# Patient Record
Sex: MALE | Race: WHITE | NOT HISPANIC OR LATINO | URBAN - METROPOLITAN AREA
[De-identification: names, ages, dates, MRNs, and addresses within clinical notes are randomized per-mention and may not be internally consistent; named-entity substitution may affect disease eponyms.]

---

## 2021-01-04 ENCOUNTER — OUT OF OFFICE VISIT (OUTPATIENT)
Dept: URBAN - METROPOLITAN AREA MEDICAL CENTER 24 | Facility: MEDICAL CENTER | Age: 61
End: 2021-01-04
Payer: COMMERCIAL

## 2021-01-04 DIAGNOSIS — E80.6 ABNORMAL BILIRUBIN TEST: ICD-10-CM

## 2021-01-04 DIAGNOSIS — R93.3 ABN FINDINGS-GI TRACT: ICD-10-CM

## 2021-01-04 DIAGNOSIS — R74.8 ABNORMAL ALKALINE PHOSPHATASE TEST: ICD-10-CM

## 2021-01-04 DIAGNOSIS — K76.0 FATTY (CHANGE OF) LIVER, NOT ELSEWHERE CLASSIFIED: ICD-10-CM

## 2021-01-04 PROCEDURE — 99222 1ST HOSP IP/OBS MODERATE 55: CPT | Performed by: INTERNAL MEDICINE

## 2021-01-04 PROCEDURE — G8427 DOCREV CUR MEDS BY ELIG CLIN: HCPCS | Performed by: INTERNAL MEDICINE

## 2021-01-04 PROCEDURE — 99232 SBSQ HOSP IP/OBS MODERATE 35: CPT | Performed by: INTERNAL MEDICINE

## 2021-01-05 ENCOUNTER — OUT OF OFFICE VISIT (OUTPATIENT)
Dept: URBAN - METROPOLITAN AREA MEDICAL CENTER 24 | Facility: MEDICAL CENTER | Age: 61
End: 2021-01-05
Payer: COMMERCIAL

## 2021-01-05 DIAGNOSIS — K86.89 ATROPHIC PANCREAS: ICD-10-CM

## 2021-01-05 DIAGNOSIS — K83.1 AMPULLARY STENOSIS: ICD-10-CM

## 2021-01-05 PROCEDURE — 43262 ENDO CHOLANGIOPANCREATOGRAPH: CPT | Performed by: INTERNAL MEDICINE

## 2021-01-05 PROCEDURE — 43260 ERCP W/SPECIMEN COLLECTION: CPT | Performed by: INTERNAL MEDICINE

## 2021-01-12 ENCOUNTER — OFFICE VISIT (OUTPATIENT)
Dept: URBAN - METROPOLITAN AREA CLINIC 86 | Facility: CLINIC | Age: 61
End: 2021-01-12
Payer: COMMERCIAL

## 2021-01-12 ENCOUNTER — LAB OUTSIDE AN ENCOUNTER (OUTPATIENT)
Dept: URBAN - METROPOLITAN AREA CLINIC 86 | Facility: CLINIC | Age: 61
End: 2021-01-12

## 2021-01-12 DIAGNOSIS — R97.8 ELEVATED CA 19-9 LEVEL: ICD-10-CM

## 2021-01-12 DIAGNOSIS — R17 JAUNDICE: ICD-10-CM

## 2021-01-12 DIAGNOSIS — E66.8 OTHER OBESITY: ICD-10-CM

## 2021-01-12 DIAGNOSIS — E66.9 OBESITY: ICD-10-CM

## 2021-01-12 DIAGNOSIS — K76.0 FATTY LIVER: ICD-10-CM

## 2021-01-12 DIAGNOSIS — R74.8 ABNORMAL LIVER ENZYMES: ICD-10-CM

## 2021-01-12 DIAGNOSIS — Z71.89 VACCINE COUNSELING: ICD-10-CM

## 2021-01-12 PROCEDURE — 99244 OFF/OP CNSLTJ NEW/EST MOD 40: CPT

## 2021-01-12 PROCEDURE — 87522 HEPATITIS C REVRS TRNSCRPJ: CPT

## 2021-01-12 PROCEDURE — 82104 ALPHA-1-ANTITRYPSIN PHENO: CPT

## 2021-01-12 PROCEDURE — 1036F TOBACCO NON-USER: CPT

## 2021-01-12 PROCEDURE — 99204 OFFICE O/P NEW MOD 45 MIN: CPT

## 2021-01-12 PROCEDURE — G9903 PT SCRN TBCO ID AS NON USER: HCPCS

## 2021-01-12 RX ORDER — DOCUSATE SODIUM 100 MG/1
1 CAPSULE AS NEEDED CAPSULE, LIQUID FILLED ORAL ONCE A DAY
Status: ON HOLD | COMMUNITY

## 2021-01-12 RX ORDER — IBUPROFEN 600 MG/1
1 TABLET WITH FOOD OR MILK AS NEEDED TABLET ORAL THREE TIMES A DAY
Status: ON HOLD | COMMUNITY

## 2021-01-12 RX ORDER — MUPIROCIN 20 MG/G
1 APPLICATION OINTMENT TOPICAL THREE TIMES A DAY
Status: ON HOLD | COMMUNITY

## 2021-01-12 RX ORDER — ESOMEPRAZOLE MAGNESIUM 20 MG/1
1 CAPSULE CAPSULE, DELAYED RELEASE ORAL ONCE A DAY
Status: ACTIVE | COMMUNITY

## 2021-01-12 RX ORDER — HYDROCODONE BITARTRATE AND ACETAMINOPHEN 5; 325 MG/1; MG/1
1 TABLET AS NEEDED TABLET ORAL
Status: ON HOLD | COMMUNITY

## 2021-01-12 RX ORDER — ONDANSETRON 4 MG/1
1 TABLET ON THE TONGUE AND ALLOW TO DISSOLVE TABLET, ORALLY DISINTEGRATING ORAL ONCE A DAY
Status: ON HOLD | COMMUNITY

## 2021-01-12 NOTE — HPI-TODAY'S VISIT:
Pt here for fatty liver and abnormal lfts.   pt had abd pain early summer 2020, ct scan done at that time and and neg at that time. then in oct 2020 had severe abd pain and ct scan done at that time, ct not done.   he has lost 10lbs since dec due to not being able to eat.  he is working on this, reduced soda intake and candy intake.   He went in again for abd pain and his lfts have improved from 1/2 to after eus done.   ercp attempted but was not able to do-is scheduled to do it again at [place].   Labs from January 2nd 2021 show sodium 139, potassium 3.9 , , alt 691, total bilirubin 5.0,  , creatinine 0.9. he was at [facility] and had a eus done by Dr. Hamilton on 1/5: a small tiny structure was noted in the bile duct in the head of the pancreas, fine needle aspiration of the structure was done . Normal pancreatic duct noted. Recommend ca 19-9 level and ERCP an follow up of FNA of the bile duct stricture.   Labs 1/5- 1/7/2021: Wbc 14.2 Hgb 14.2 Plt 363 Inr 1.0 Alp 239 Ast 45 Alt 215 Tb 1.3 Ca 19-9 elevated 69 Actin ab igg <20 CT January 2nd 2021 showed cholecystectomy, moderate hepatic steatosis, small right kidney cyst , mild central intrahepatic in extrahepatic biliary ductal dilatation , MRCP may be performed . Next paragraph ultrasound right upper quadrant December 2018 showed cholelithiasis with Gallbladder wall thickening suspicious for Cholecystitis recommend hida scan Mri cholangiogram without contrast done on 1/3/21 showed: Mild intrahepatic and extrahepatic biliary ductal dilatation Cholecystectomy No gb stones noted Moderate hepatic steatosis  Duration of visit 45 mins with over 50% of the time explaining patient's condition and treatment plan and reviewing records

## 2021-01-19 ENCOUNTER — TELEPHONE ENCOUNTER (OUTPATIENT)
Dept: URBAN - METROPOLITAN AREA CLINIC 6 | Facility: CLINIC | Age: 61
End: 2021-01-19

## 2021-01-19 LAB
A/G RATIO: 1.2
ALBUMIN: 3.9
ALKALINE PHOSPHATASE: 181
ALPHA-1-ANTITRYPSIN, SERUM: 197
ALT (SGPT): 87
ANA DIRECT: NEGATIVE
AST (SGOT): 33
BASO (ABSOLUTE): 0.1
BASOS: 1
BILIRUBIN, TOTAL: 0.6
BUN/CREATININE RATIO: 21
BUN: 18
CALCIUM: 8.8
CARBON DIOXIDE, TOTAL: 20
CERULOPLASMIN: 35.2
CHLORIDE: 105
CREATININE: 0.87
EGFR IF AFRICN AM: 109
EGFR IF NONAFRICN AM: 94
EOS (ABSOLUTE): 0.3
EOS: 3
FERRITIN, SERUM: 136
GLOBULIN, TOTAL: 3.3
GLUCOSE: 107
HCV GENOTYPE: (no result)
HCV LOG10: (no result)
HEMATOCRIT: 40.7
HEMATOLOGY COMMENTS:: (no result)
HEMOGLOBIN: 13.3
HEP A AB, TOTAL: POSITIVE
HEPATITIS B SURF AB QUANT: 9.3
HEPATITIS C QUANTITATION: (no result)
IMMATURE CELLS: (no result)
IMMATURE GRANS (ABS): 0
IMMATURE GRANULOCYTES: 1
IRON BIND.CAP.(TIBC): 316
IRON SATURATION: 22
IRON: 69
LYMPHS (ABSOLUTE): 1.7
LYMPHS: 21
MCH: 28.6
MCHC: 32.7
MCV: 88
MITOCHONDRIAL (M2) ANTIBODY: <20
MONOCYTES(ABSOLUTE): 0.8
MONOCYTES: 9
NEUTROPHILS (ABSOLUTE): 5.5
NEUTROPHILS: 65
NRBC: (no result)
PHENOTYPE (PI): (no result)
PLATELETS: 392
POTASSIUM: 5
PROTEIN, TOTAL: 7.2
RBC: 4.65
RDW: 13.2
SODIUM: 137
TEST INFORMATION:: (no result)
UIBC: 247
WBC: 8.4

## 2021-02-25 ENCOUNTER — OFFICE VISIT (OUTPATIENT)
Dept: URBAN - METROPOLITAN AREA TELEHEALTH 2 | Facility: TELEHEALTH | Age: 61
End: 2021-02-25
Payer: COMMERCIAL

## 2021-02-25 DIAGNOSIS — K76.0 FATTY LIVER: ICD-10-CM

## 2021-02-25 DIAGNOSIS — R97.8 ELEVATED CA 19-9 LEVEL: ICD-10-CM

## 2021-02-25 DIAGNOSIS — E66.9 OBESITY: ICD-10-CM

## 2021-02-25 DIAGNOSIS — R74.8 ABNORMAL LIVER ENZYMES: ICD-10-CM

## 2021-02-25 PROCEDURE — 99214 OFFICE O/P EST MOD 30 MIN: CPT

## 2021-02-25 RX ORDER — DOCUSATE SODIUM 100 MG/1
1 CAPSULE AS NEEDED CAPSULE, LIQUID FILLED ORAL ONCE A DAY
Status: ON HOLD | COMMUNITY

## 2021-02-25 RX ORDER — HYDROCODONE BITARTRATE AND ACETAMINOPHEN 5; 325 MG/1; MG/1
1 TABLET AS NEEDED TABLET ORAL
Status: ON HOLD | COMMUNITY

## 2021-02-25 RX ORDER — MUPIROCIN 20 MG/G
1 APPLICATION OINTMENT TOPICAL THREE TIMES A DAY
Status: ON HOLD | COMMUNITY

## 2021-02-25 RX ORDER — ESOMEPRAZOLE MAGNESIUM 20 MG/1
1 CAPSULE CAPSULE, DELAYED RELEASE ORAL ONCE A DAY
Status: ACTIVE | COMMUNITY

## 2021-02-25 RX ORDER — ONDANSETRON 4 MG/1
1 TABLET ON THE TONGUE AND ALLOW TO DISSOLVE TABLET, ORALLY DISINTEGRATING ORAL ONCE A DAY
Status: ON HOLD | COMMUNITY

## 2021-02-25 RX ORDER — IBUPROFEN 600 MG/1
1 TABLET WITH FOOD OR MILK AS NEEDED TABLET ORAL THREE TIMES A DAY
Status: ON HOLD | COMMUNITY

## 2021-02-25 NOTE — HPI-OTHER HISTORIES
Pt here for follow up fatty liver and abnormal lfts. did telehealth visit with pt and wife. he has some abd pain from stent, recommend he see gi. lab screens neg.  due for imaging again in july 2021. doing well with weight and losing weight.  they are seeing gi next month for follow up for ercp stent removal.  had 2 episodes of ruq pain, recommend he follow up with them and questions about screening for pancreatic cancer.   RECAP: pt had abd pain early summer 2020, ct scan done at that time and and neg at that time. then in oct 2020 had severe abd pain and ct scan done at that time, ct not done. he has lost 10lbs since dec due to not being able to eat. he is working on this, reduced soda intake and candy intake. He went in again for abd pain and his lfts have improved from 1/2 to after eus done. ercp attempted but was not able to do-is scheduled to do it again at [place]. Labs from January 2nd 2021 show sodium 139, potassium 3.9 , , alt 691, total bilirubin 5.0,  , creatinine 0.9. he was at [facility] and had a eus done by Dr. Hamilton on 1/5: a small tiny structure was noted in the bile duct in the head of the pancreas, fine needle aspiration of the structure was done . Normal pancreatic duct noted. Recommend ca 19-9 level and ERCP an follow up of FNA of the bile duct stricture.  1/19/21: alp 107 ast 26 alt 36 tb 0.8 na 138 k 4.3 wbc 8.7 hgb 13.5 plt 287  Labs 1/5- 1/7/2021: Wbc 14.2 Hgb 14.2 Plt 363 Inr 1.0 Alp 239 Ast 45 Alt 215 Tb 1.3 Ca 19-9 elevated 69 Actin ab igg less than20 CT January 2nd 2021 showed cholecystectomy, moderate hepatic steatosis, small right kidney cyst , mild central intrahepatic in extrahepatic biliary ductal dilatation , MRCP may be performed . Next paragraph ultrasound right upper quadrant December 2018 showed cholelithiasis with Gallbladder wall thickening suspicious for Cholecystitis recommend hida scan Mri cholangiogram without contrast done on 1/3/21 showed: Mild intrahepatic and extrahepatic biliary ductal dilatation Cholecystectomy No gb stones noted Moderate hepatic steatosis

## 2021-04-25 ENCOUNTER — LAB OUTSIDE AN ENCOUNTER (OUTPATIENT)
Dept: URBAN - METROPOLITAN AREA TELEHEALTH 2 | Facility: TELEHEALTH | Age: 61
End: 2021-04-25

## 2021-07-16 ENCOUNTER — OFFICE VISIT (OUTPATIENT)
Dept: URBAN - METROPOLITAN AREA CLINIC 53 | Facility: CLINIC | Age: 61
End: 2021-07-16

## 2021-07-19 ENCOUNTER — LAB OUTSIDE AN ENCOUNTER (OUTPATIENT)
Dept: URBAN - METROPOLITAN AREA TELEHEALTH 2 | Facility: TELEHEALTH | Age: 61
End: 2021-07-19

## 2021-07-30 ENCOUNTER — OFFICE VISIT (OUTPATIENT)
Dept: URBAN - METROPOLITAN AREA CLINIC 53 | Facility: CLINIC | Age: 61
End: 2021-07-30
Payer: COMMERCIAL

## 2021-07-30 DIAGNOSIS — K76.0 HEPATIC STEATOSIS: ICD-10-CM

## 2021-07-30 PROCEDURE — 93975 VASCULAR STUDY: CPT | Performed by: PHYSICIAN ASSISTANT

## 2021-07-30 PROCEDURE — 76705 ECHO EXAM OF ABDOMEN: CPT | Performed by: PHYSICIAN ASSISTANT

## 2021-08-01 ENCOUNTER — TELEPHONE ENCOUNTER (OUTPATIENT)
Dept: URBAN - METROPOLITAN AREA CLINIC 92 | Facility: CLINIC | Age: 61
End: 2021-08-01

## 2021-08-01 NOTE — HPI-TODAY'S VISIT:
61 yo Male and last seen Feb 2021 by Ayanna LANCE and now here for fatty liver and abn lfts.  Pt here for follow up fatty liver and abnormal lfts. did telehealth visit with pt and wife. he has some abd pain from stent, recommend he see gi. lab screens neg.  due for imaging again in july 2021. doing well with weight and losing weight.  they are seeing gi next month for follow up for ercp stent removal.  had 2 episodes of ruq pain, recommend he follow up with them and questions about screening for pancreatic cancer.   Summary of [facility] records [id number] regarding July 23, 2021 visit. Appeared that the patient came into the hospital for an assessment of chronic pain issues.  Imaging as well as labs were performed. White blood cell count was 10.1 MCV 83.4 hemoglobin 13 platelet count 236.  Urinalysis was performed with nitrite negative.  Creatinine 1.0 BUN 27 glucose 137 potassium 3.7 AST 79 ALT 47 lipase of 16 calcium 8.4 albumin 3.7 total protein 6.7 bilirubin 0.4 alkaline phosphatase 105 direct bilirubin 0.1 chloride 107 sodium 139.  Temperature was 36.6 blood pressure was initially 212/89 then 136/65 pulse went from 71-58 so clearly blood pressure was higher with the pain. CT of the abdomen also sent from July 23 that showed prior cholecystectomy with borderline mild dilation of the biliary system, measuring 7 mm in diameter, adrenal glands, spleen, liver, pancreas and kidneys were within normal limits.  Exophytic right renal cyst was seen.  No hydronephrosis.  Normal appendix.  Moderate sized hiatal hernia.  No free fluid or obstruction seen.  No significant adenopathy.  Aortic atherosclerosis seen but without aneurysm.  Portal vein, SMV, and splenic vein appear grossly patent.  Vasectomy clips were seen.  Bladder was distended.  Prostate gland was unremarkable.  Minor sigmoid colon diverticulosis was seen.  No aggressive osseous lesions were seen.  L4 and L5 superior endplate compression deformities were again noted.  Multiple degenerative spondylosis changes seen. Prior cholecystectomy with borderline mild dilation of biliary system was seen and moderate sized hiatal hernia as main finding.   RECAP: pt had abd pain early summer 2020, ct scan done at that time and and neg at that time. then in oct 2020 had severe abd pain and ct scan done at that time, ct not done. he has lost 10lbs since dec due to not being able to eat. he is working on this, reduced soda intake and candy intake. He went in again for abd pain and his lfts have improved from 1/2 to after eus done. ercp attempted but was not able to do-is scheduled to do it again at [place]. Labs from January 2nd 2021 show sodium 139, potassium 3.9 , , alt 691, total bilirubin 5.0,  , creatinine 0.9. he was at [facility] and had a eus done by Dr. Hamilton on 1/5: a small tiny structure was noted in the bile duct in the head of the pancreas, fine needle aspiration of the structure was done . Normal pancreatic duct noted. Recommend ca 19-9 level and ERCP an follow up of FNA of the bile duct stricture.  1/19/21: alp 107 ast 26 alt 36 tb 0.8 na 138 k 4.3 wbc 8.7 hgb 13.5 plt 287  Labs 1/5- 1/7/2021: Wbc 14.2 Hgb 14.2 Plt 363 Inr 1.0 Alp 239 Ast 45 Alt 215 Tb 1.3 Ca 19-9 elevated 69 Actin ab igg less than20 CT January 2nd 2021 showed cholecystectomy, moderate hepatic steatosis, small right kidney cyst , mild central intrahepatic in extrahepatic biliary ductal dilatation , MRCP may be performed . Next paragraph ultrasound right upper quadrant December 2018 showed cholelithiasis with Gallbladder wall thickening suspicious for Cholecystitis recommend hida scan Mri cholangiogram without contrast done on 1/3/21 showed: Mild intrahepatic and extrahepatic biliary ductal dilatation Cholecystectomy No gb stones noted Moderate hepatic steatosis  Stressed to pt the need for social distancing and strict handwashing and wearing a mask and to follow any other new or added CDC recommendations as this is an evolving target.  Duration of the visit was min with 5min and then with 25 min from am by clock as healow clock off due to internet fluxes for the video visit portion of the visit with time spent reviewing chart and events with the pt.

## 2021-08-02 ENCOUNTER — OFFICE VISIT (OUTPATIENT)
Dept: URBAN - METROPOLITAN AREA TELEHEALTH 2 | Facility: TELEHEALTH | Age: 61
End: 2021-08-02
Payer: COMMERCIAL

## 2021-08-02 ENCOUNTER — LAB OUTSIDE AN ENCOUNTER (OUTPATIENT)
Dept: URBAN - METROPOLITAN AREA TELEHEALTH 2 | Facility: TELEHEALTH | Age: 61
End: 2021-08-02

## 2021-08-02 DIAGNOSIS — R74.8 ABNORMAL LIVER ENZYMES: ICD-10-CM

## 2021-08-02 DIAGNOSIS — R10.84 GENERALIZED ABDOMINAL PAIN: ICD-10-CM

## 2021-08-02 DIAGNOSIS — K76.0 FATTY LIVER: ICD-10-CM

## 2021-08-02 DIAGNOSIS — K83.4 SPHINCTER OF ODDI DYSFUNCTION: ICD-10-CM

## 2021-08-02 PROCEDURE — 99214 OFFICE O/P EST MOD 30 MIN: CPT

## 2021-08-02 RX ORDER — ESOMEPRAZOLE MAGNESIUM 20 MG/1
1 CAPSULE CAPSULE, DELAYED RELEASE ORAL ONCE A DAY
Status: ACTIVE | COMMUNITY

## 2021-08-02 RX ORDER — DOCUSATE SODIUM 100 MG/1
1 CAPSULE AS NEEDED CAPSULE, LIQUID FILLED ORAL ONCE A DAY
Status: ON HOLD | COMMUNITY

## 2021-08-02 RX ORDER — MUPIROCIN 20 MG/G
1 APPLICATION OINTMENT TOPICAL THREE TIMES A DAY
Status: ON HOLD | COMMUNITY

## 2021-08-02 RX ORDER — HYDROCODONE BITARTRATE AND ACETAMINOPHEN 5; 325 MG/1; MG/1
1 TABLET AS NEEDED TABLET ORAL
Status: ON HOLD | COMMUNITY

## 2021-08-02 RX ORDER — ONDANSETRON 4 MG/1
1 TABLET ON THE TONGUE AND ALLOW TO DISSOLVE TABLET, ORALLY DISINTEGRATING ORAL ONCE A DAY
Status: ON HOLD | COMMUNITY

## 2021-08-02 NOTE — HPI-OTHER HISTORIES
61 yo Male and last seen Feb 2021 by Ayanna LANCE and is  here for fatty liver and abn lfts.  Pt here for follow up fatty liver and abnormal lfts.   Ayanna scherer in Feb 2021 did telehealth visit with pt and wife.   Feb abd pain from stent and that was from a bile duct stent and had stent x 2 one main and one in pancreas and that was put in for a short time and they had done a sphincterotomy. Those were removed april 2021. He says pain did relieve the pain and the started to have pain.  He had severe pain and went to hospital. lasted a 1/2 hour.  Says first attempt ab lopez and saw other Dr at [place].  Summary of [facility] records that sent regarding July 23, 2021 visit:  Appeared that the patient came into the hospital for an assessment of acute pain.  Imaging as well as labs were performed. 7-  White blood cell count was 10.1 MCV 83.4 hemoglobin 13 platelet count 236.  Urinalysis was performed with nitrite negative.  Creatinine 1.0 BUN 27 glucose 137 potassium 3.7 AST 79 ALT 47 lipase of 16 calcium 8.4 albumin 3.7 total protein 6.7 bilirubin 0.4 alkaline phosphatase 105 direct bilirubin 0.1 chloride 107 sodium 139.   Temperature was 36.6 blood pressure was initially 212/89 due to pain then 136/65and  pulse went from 71-58 so clearly blood pressure was higher with the pain.  CT of the abdomen also sent from July 23 2021 that showed prior cholecystectomy with borderline mild dilation of the biliary system, measuring 7 mm in diameter, adrenal glands, spleen, liver, pancreas and kidneys were within normal limits.  Exophytic right renal cyst was seen.  No hydronephrosis.  Normal appendix.  Moderate sized hiatal hernia.  No free fluid or obstruction seen.  No significant adenopathy.  Aortic atherosclerosis seen but without aneurysm.  Portal vein, SMV, and splenic vein appear grossly patent.  Vasectomy clips were seen.  Bladder was distended.  Prostate gland was unremarkable.  Minor sigmoid colon diverticulosis was seen.  No aggressive osseous lesions were seen.  L4 and L5 superior endplate compression deformities were again noted.  Multiple degenerative spondylosis changes seen. Prior cholecystectomy with borderline mild dilation of biliary system was seen and moderate sized hiatal hernia as main finding.  Pt was unaware of the atherosclerosis of aorta and can discuss with primary doctor. Historical info:  {t had abd pain early summer 2020, ct scan done at that time and and neg at that time.  Then in oct 2020 had severe abd pain and ct scan done at that time,  and he also had lost 10lbs since dec.  Pt says pain led to stent tx and done in Jan 2021 and says since the stent tried  that the pain had been better on  his pain. While stent in no pain, and when out occ mild pain 10-15 sec and felt something there but nothing like july 23 2021.  He is to see a doctor.  January 2nd 2021 show sodium 139, potassium 3.9 , , alt 691, total bilirubin 5.0,  , creatinine 0.9. That speaks to the pt have a sig rise with the pain.  GB surgery was done 3.5 yrs.  [facility] and had a eus done by Dr. Hamilton on 1/5/21 : a small tiny structure was noted in the bile duct in the head of the pancreas, fine needleaspiration of the structure was done . Normal pancreatic duct noted.   1/19/21: alp 107 ast 26 alt 36 tb 0.8 na 138 k 4.3 wbc 8.7 hgb 13.5 plt 287  Labs 1/5- 1/7/2021: Wbc 14.2 Hgb 14.2 Plt 363 Inr 1.0 Alp 239 Ast 45 Alt 215 Tb 1.3 Ca 19-9 elevated 69 Actin ab igg less than20  Clearly the labs worse with pain.  CT January 2nd 2021 showed cholecystectomy, moderate hepatic steatosis, small right kidney cyst , mild central intrahepatic in extrahepatic biliary ductal dilatation , MRCP may be performed .   Right upper quadrant December 2018 showed cholelithiasis with gallbladder wall thickening suspicious for Cholecystitis recommended and he did that surgery was done at [facility] in [place].  Hida scan also.  Mri cholangiogram without contrast done on 1/3/21 showed: Mild intrahepatic and extrahepatic biliary ductal dilatation, Cholecystectomy, No gb stones noted. Moderate hepatic steatosis  No high cholesterol. No etoh hx.  He is 192 pounds and he is 67 inches.  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  Plan: 1. Pt says he is not sure who is he to see and re the recurrence of the pain. 2. Pain was better with treatment and so apepars that the tx helped but not been definitive. 3. My suspicion is that the redo labs would be better as pain episodes push this up. 4. Pt needs to redo the labs now. 5. Needs to get the bile duct issue addressed and then can try to focus on this.  Stressed to pt the need for social distancing and strict handwashing and wearing a mask and to follow any other new or added CDC recommendations as this is an evolving target.  Duration of the visit was 30 min with 5 min of prep and then 25 min from 100 to 130 by clock as pt failed doximity and then by phone as internet and phone had issues.

## 2021-08-02 NOTE — EXAM-PHYSICAL EXAM
Telemed self reported:  Gen: no distress. Eyes: no jaundice. Mouth: no thrush. CV: no chest pain. Resp: no wheezes. Abd: no pain now but july 23 severe episode. Ext: no edema.	 Neuro: no weakness.

## 2021-09-03 ENCOUNTER — OFFICE VISIT (OUTPATIENT)
Dept: URBAN - METROPOLITAN AREA CLINIC 82 | Facility: CLINIC | Age: 61
End: 2021-09-03

## 2021-09-03 RX ORDER — MUPIROCIN 20 MG/G
1 APPLICATION OINTMENT TOPICAL THREE TIMES A DAY
Status: ON HOLD | COMMUNITY

## 2021-09-03 RX ORDER — DOCUSATE SODIUM 100 MG/1
1 CAPSULE AS NEEDED CAPSULE, LIQUID FILLED ORAL ONCE A DAY
Status: ON HOLD | COMMUNITY

## 2021-09-03 RX ORDER — ONDANSETRON 4 MG/1
1 TABLET ON THE TONGUE AND ALLOW TO DISSOLVE TABLET, ORALLY DISINTEGRATING ORAL ONCE A DAY
Status: ON HOLD | COMMUNITY

## 2021-09-03 RX ORDER — HYDROCODONE BITARTRATE AND ACETAMINOPHEN 5; 325 MG/1; MG/1
1 TABLET AS NEEDED TABLET ORAL
Status: ON HOLD | COMMUNITY

## 2021-09-03 RX ORDER — ESOMEPRAZOLE MAGNESIUM 20 MG/1
1 CAPSULE CAPSULE, DELAYED RELEASE ORAL ONCE A DAY
Status: ACTIVE | COMMUNITY

## 2021-10-01 ENCOUNTER — OFFICE VISIT (OUTPATIENT)
Dept: URBAN - METROPOLITAN AREA TELEHEALTH 2 | Facility: TELEHEALTH | Age: 61
End: 2021-10-01

## 2021-10-01 ENCOUNTER — TELEPHONE ENCOUNTER (OUTPATIENT)
Dept: URBAN - METROPOLITAN AREA CLINIC 92 | Facility: CLINIC | Age: 61
End: 2021-10-01

## 2021-10-01 PROBLEM — 24184005: Status: ACTIVE | Noted: 2021-08-02

## 2021-10-01 PROBLEM — 409063005 COUNSELING: Status: ACTIVE | Noted: 2021-01-12

## 2021-10-01 PROBLEM — 414916001 OBESITY: Status: ACTIVE | Noted: 2021-01-12

## 2021-10-01 RX ORDER — ESOMEPRAZOLE MAGNESIUM 20 MG/1
1 CAPSULE CAPSULE, DELAYED RELEASE ORAL ONCE A DAY
Status: ACTIVE | COMMUNITY

## 2021-10-01 RX ORDER — MUPIROCIN 20 MG/G
1 APPLICATION OINTMENT TOPICAL THREE TIMES A DAY
Status: ON HOLD | COMMUNITY

## 2021-10-01 RX ORDER — ONDANSETRON 4 MG/1
1 TABLET ON THE TONGUE AND ALLOW TO DISSOLVE TABLET, ORALLY DISINTEGRATING ORAL ONCE A DAY
Status: ON HOLD | COMMUNITY

## 2021-10-01 RX ORDER — HYDROCODONE BITARTRATE AND ACETAMINOPHEN 5; 325 MG/1; MG/1
1 TABLET AS NEEDED TABLET ORAL
Status: ON HOLD | COMMUNITY

## 2021-10-01 RX ORDER — DOCUSATE SODIUM 100 MG/1
1 CAPSULE AS NEEDED CAPSULE, LIQUID FILLED ORAL ONCE A DAY
Status: ON HOLD | COMMUNITY

## 2021-10-01 NOTE — HPI-TODAY'S VISIT:
61 yo Male and prior  seen Feb 2021 by Ayanna العلي PAC and then aug 2021 by us and is  here for fatty liver and abn lfts.  Pt here for follow up fatty liver and abnormal lfts.   Ayanna scherer in Feb 2021 did telehealth visit with pt and wife.   Feb abd pain from stent and that was from a bile duct stent and had stent x 2 one main and one in pancreas and that was put in for a short time and they had done a sphincterotomy. Those were removed april 2021. He says pain did relieve the pain and the started to have pain.  He had severe pain and went to hospital. lasted a 1/2 hour.  Says first attempt ab lopez and saw other Dr at [place].  Summary of [facility] records that sent regarding July 23, 2021 visit:  Appeared that the patient came into the hospital for an assessment of acute pain.  Imaging as well as labs were performed. 7-  White blood cell count was 10.1 MCV 83.4 hemoglobin 13 platelet count 236.  Urinalysis was performed with nitrite negative.  Creatinine 1.0 BUN 27 glucose 137 potassium 3.7 AST 79 ALT 47 lipase of 16 calcium 8.4 albumin 3.7 total protein 6.7 bilirubin 0.4 alkaline phosphatase 105 direct bilirubin 0.1 chloride 107 sodium 139.   Temperature was 36.6 blood pressure was initially 212/89 due to pain then 136/65and  pulse went from 71-58 so clearly blood pressure was higher with the pain.  CT of the abdomen also sent from July 23 2021 that showed prior cholecystectomy with borderline mild dilation of the biliary system, measuring 7 mm in diameter, adrenal glands, spleen, liver, pancreas and kidneys were within normal limits.  Exophytic right renal cyst was seen.  No hydronephrosis.  Normal appendix.  Moderate sized hiatal hernia.  No free fluid or obstruction seen.  No significant adenopathy.  Aortic atherosclerosis seen but without aneurysm.  Portal vein, SMV, and splenic vein appear grossly patent.  Vasectomy clips were seen.  Bladder was distended.  Prostate gland was unremarkable.  Minor sigmoid colon diverticulosis was seen.  No aggressive osseous lesions were seen.  L4 and L5 superior endplate compression deformities were again noted.  Multiple degenerative spondylosis changes seen. Prior cholecystectomy with borderline mild dilation of biliary system was seen and moderate sized hiatal hernia as main finding.  Pt was unaware of the atherosclerosis of aorta and can discuss with primary doctor. Historical info:  {t had abd pain early summer 2020, ct scan done at that time and and neg at that time.  Then in oct 2020 had severe abd pain and ct scan done at that time,  and he also had lost 10lbs since dec.  Pt says pain led to stent tx and done in Jan 2021 and says since the stent tried  that the pain had been better on  his pain. While stent in no pain, and when out occ mild pain 10-15 sec and felt something there but nothing like july 23 2021.  He is to see a doctor.  January 2nd 2021 show sodium 139, potassium 3.9 , , alt 691, total bilirubin 5.0,  , creatinine 0.9. That speaks to the pt have a sig rise with the pain.  GB surgery was done 3.5 yrs.  [facility] and had a eus done by Dr. Hamilton on 1/5/21 : a small tiny structure was noted in the bile duct in the head of the pancreas, fine needleaspiration of the structure was done . Normal pancreatic duct noted.   1/19/21: alp 107 ast 26 alt 36 tb 0.8 na 138 k 4.3 wbc 8.7 hgb 13.5 plt 287  Labs 1/5- 1/7/2021: Wbc 14.2 Hgb 14.2 Plt 363 Inr 1.0 Alp 239 Ast 45 Alt 215 Tb 1.3 Ca 19-9 elevated 69 Actin ab igg less than20  Clearly the labs worse with pain.  CT January 2nd 2021 showed cholecystectomy, moderate hepatic steatosis, small right kidney cyst , mild central intrahepatic in extrahepatic biliary ductal dilatation , MRCP may be performed .   Right upper quadrant December 2018 showed cholelithiasis with gallbladder wall thickening suspicious for Cholecystitis recommended and he did that surgery was done at [facility] in [place].  Hida scan also.  Mri cholangiogram without contrast done on 1/3/21 showed: Mild intrahepatic and extrahepatic biliary ductal dilatation, Cholecystectomy, No gb stones noted. Moderate hepatic steatosis  No high cholesterol. No etoh hx.  He is 192 pounds and he is 67 inches.  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  Plan: 1. Pt says he is not sure who is he to see and re the recurrence of the pain. 2. Pain was better with treatment and so apepars that the tx helped but not been definitive. 3. My suspicion is that the redo labs would be better as pain episodes push this up. 4. Pt needs to redo the labs now. 5. Needs to get the bile duct issue addressed and then can try to focus on this.  Stressed to pt the need for social distancing and strict handwashing and wearing a mask and to follow any other new or added CDC recommendations as this is an evolving target.  Duration of the visit was min with 5 min of prep and then 25 min from 100 to 130 by clock as pt failed doximity and then by phone as internet and phone had issues.

## 2021-11-03 ENCOUNTER — OFFICE VISIT (OUTPATIENT)
Dept: URBAN - METROPOLITAN AREA CLINIC 82 | Facility: CLINIC | Age: 61
End: 2021-11-03

## 2021-11-05 ENCOUNTER — OFFICE VISIT (OUTPATIENT)
Dept: URBAN - METROPOLITAN AREA CLINIC 82 | Facility: CLINIC | Age: 61
End: 2021-11-05

## 2021-12-10 ENCOUNTER — LAB OUTSIDE AN ENCOUNTER (OUTPATIENT)
Dept: URBAN - METROPOLITAN AREA CLINIC 82 | Facility: CLINIC | Age: 61
End: 2021-12-10

## 2021-12-10 ENCOUNTER — DASHBOARD ENCOUNTERS (OUTPATIENT)
Age: 61
End: 2021-12-10

## 2021-12-10 ENCOUNTER — OFFICE VISIT (OUTPATIENT)
Dept: URBAN - METROPOLITAN AREA CLINIC 82 | Facility: CLINIC | Age: 61
End: 2021-12-10
Payer: COMMERCIAL

## 2021-12-10 VITALS
HEIGHT: 67 IN | HEART RATE: 59 BPM | DIASTOLIC BLOOD PRESSURE: 86 MMHG | WEIGHT: 209.4 LBS | SYSTOLIC BLOOD PRESSURE: 138 MMHG | BODY MASS INDEX: 32.87 KG/M2 | TEMPERATURE: 97.7 F

## 2021-12-10 DIAGNOSIS — R10.11 RUQ ABDOMINAL PAIN: ICD-10-CM

## 2021-12-10 DIAGNOSIS — K83.1 BILE DUCT STRICTURE: ICD-10-CM

## 2021-12-10 PROBLEM — 233955003: Status: ACTIVE | Noted: 2021-08-02

## 2021-12-10 PROBLEM — 18165001 JAUNDICE: Status: ACTIVE | Noted: 2021-08-02

## 2021-12-10 PROBLEM — 301717006: Status: ACTIVE | Noted: 2021-12-10

## 2021-12-10 PROBLEM — 197321007 FATTY LIVER: Status: ACTIVE | Noted: 2021-01-12

## 2021-12-10 PROBLEM — 266435005: Status: ACTIVE | Noted: 2021-08-02

## 2021-12-10 PROBLEM — 166643006 LIVER ENZYMES ABNORMAL: Status: ACTIVE | Noted: 2021-01-12

## 2021-12-10 PROBLEM — 430887001: Status: ACTIVE | Noted: 2021-08-02

## 2021-12-10 PROBLEM — 30144000 OBSTRUCTION OF BILE DUCT: Status: ACTIVE | Noted: 2021-12-10

## 2021-12-10 PROBLEM — 16227531000119109 CANCER ANTIGEN 19-9 ABOVE REFERENCE RANGE: Status: ACTIVE | Noted: 2021-01-12

## 2021-12-10 PROBLEM — 102614006: Status: ACTIVE | Noted: 2021-08-02

## 2021-12-10 PROCEDURE — G8427 DOCREV CUR MEDS BY ELIG CLIN: HCPCS | Performed by: INTERNAL MEDICINE

## 2021-12-10 PROCEDURE — G8417 CALC BMI ABV UP PARAM F/U: HCPCS | Performed by: INTERNAL MEDICINE

## 2021-12-10 PROCEDURE — 99214 OFFICE O/P EST MOD 30 MIN: CPT | Performed by: INTERNAL MEDICINE

## 2021-12-10 PROCEDURE — G9903 PT SCRN TBCO ID AS NON USER: HCPCS | Performed by: INTERNAL MEDICINE

## 2021-12-10 RX ORDER — MUPIROCIN 20 MG/G
1 APPLICATION OINTMENT TOPICAL THREE TIMES A DAY
Status: ON HOLD | COMMUNITY

## 2021-12-10 RX ORDER — DOCUSATE SODIUM 100 MG/1
1 CAPSULE AS NEEDED CAPSULE, LIQUID FILLED ORAL ONCE A DAY
Status: ON HOLD | COMMUNITY

## 2021-12-10 RX ORDER — ESOMEPRAZOLE MAGNESIUM 20 MG/1
1 CAPSULE CAPSULE, DELAYED RELEASE ORAL ONCE A DAY
Status: ACTIVE | COMMUNITY

## 2021-12-10 RX ORDER — ONDANSETRON 4 MG/1
1 TABLET ON THE TONGUE AND ALLOW TO DISSOLVE TABLET, ORALLY DISINTEGRATING ORAL ONCE A DAY
Status: ON HOLD | COMMUNITY

## 2021-12-10 RX ORDER — DICYCLOMINE HYDROCHLORIDE 10 MG/1
1 TABLET CAPSULE ORAL THREE TIMES A DAY
Qty: 90 | Refills: 1 | OUTPATIENT
Start: 2021-12-10 | End: 2022-02-08

## 2021-12-10 RX ORDER — HYDROCODONE BITARTRATE AND ACETAMINOPHEN 5; 325 MG/1; MG/1
1 TABLET AS NEEDED TABLET ORAL
Status: ON HOLD | COMMUNITY

## 2021-12-10 NOTE — HPI-TODAY'S VISIT:
12/10/2021 Patientis a 61 year old White male who presents for hospital follow up visit. EUS on 1/7/2021 showed 3mm small bile duct stricture. 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 Pancreatic biopsies were benign. Patient had two stents placed in bile duct and pancreatic duct by Dr. Cain, which were removed in March 2021. Since stent removals, he had two sudden, short episodes of severe abdominal pain, resulting in ER visit in July 2021. His latest episode of abdominal pain was about three months ago. He had cholecystectomy about 3 years ago. Recent labs with Dr. Noriega showed normal liver enzymes, as per patient.